# Patient Record
Sex: MALE | Race: WHITE | Employment: FULL TIME | URBAN - NONMETROPOLITAN AREA
[De-identification: names, ages, dates, MRNs, and addresses within clinical notes are randomized per-mention and may not be internally consistent; named-entity substitution may affect disease eponyms.]

---

## 2021-03-01 ENCOUNTER — OFFICE VISIT (OUTPATIENT)
Dept: FAMILY MEDICINE CLINIC | Age: 61
End: 2021-03-01
Payer: COMMERCIAL

## 2021-03-01 DIAGNOSIS — Z85.47 HISTORY OF TESTICULAR CANCER: ICD-10-CM

## 2021-03-01 DIAGNOSIS — M67.432 GANGLION CYST OF WRIST, LEFT: ICD-10-CM

## 2021-03-01 DIAGNOSIS — R00.2 PALPITATIONS: ICD-10-CM

## 2021-03-01 DIAGNOSIS — Z95.2 PRESENCE OF PROSTHETIC HEART VALVE: ICD-10-CM

## 2021-03-01 DIAGNOSIS — Z86.010 HISTORY OF COLON POLYPS: ICD-10-CM

## 2021-03-01 DIAGNOSIS — Z76.89 ENCOUNTER TO ESTABLISH CARE: Primary | ICD-10-CM

## 2021-03-01 DIAGNOSIS — Z86.73 HISTORY OF CVA (CEREBROVASCULAR ACCIDENT): ICD-10-CM

## 2021-03-01 DIAGNOSIS — I10 ESSENTIAL HYPERTENSION: ICD-10-CM

## 2021-03-01 DIAGNOSIS — E78.2 MIXED HYPERLIPIDEMIA: ICD-10-CM

## 2021-03-01 DIAGNOSIS — Z13.1 SCREENING FOR DIABETES MELLITUS: ICD-10-CM

## 2021-03-01 DIAGNOSIS — N18.31 STAGE 3A CHRONIC KIDNEY DISEASE (HCC): ICD-10-CM

## 2021-03-01 PROBLEM — H25.9 AGE-RELATED CATARACT OF BOTH EYES: Status: ACTIVE | Noted: 2018-02-05

## 2021-03-01 PROBLEM — N18.30 CKD (CHRONIC KIDNEY DISEASE) STAGE 3, GFR 30-59 ML/MIN (HCC): Status: ACTIVE | Noted: 2019-02-18

## 2021-03-01 PROCEDURE — 99204 OFFICE O/P NEW MOD 45 MIN: CPT | Performed by: NURSE PRACTITIONER

## 2021-03-01 RX ORDER — METOPROLOL SUCCINATE 25 MG/1
25 TABLET, EXTENDED RELEASE ORAL DAILY
COMMUNITY
Start: 2021-01-16 | End: 2021-04-27 | Stop reason: SDUPTHER

## 2021-03-01 RX ORDER — SIMVASTATIN 40 MG
TABLET ORAL
COMMUNITY
Start: 2020-05-21 | End: 2021-04-27 | Stop reason: SDUPTHER

## 2021-03-01 RX ORDER — RANITIDINE HCL 75 MG
75 TABLET ORAL 2 TIMES DAILY
COMMUNITY
End: 2021-03-01 | Stop reason: CLARIF

## 2021-03-01 RX ORDER — ASPIRIN 81 MG/1
81 TABLET, CHEWABLE ORAL DAILY
COMMUNITY

## 2021-03-01 SDOH — HEALTH STABILITY: MENTAL HEALTH: HOW OFTEN DO YOU HAVE A DRINK CONTAINING ALCOHOL?: NEVER

## 2021-03-01 SDOH — ECONOMIC STABILITY: FOOD INSECURITY: WITHIN THE PAST 12 MONTHS, YOU WORRIED THAT YOUR FOOD WOULD RUN OUT BEFORE YOU GOT MONEY TO BUY MORE.: NEVER TRUE

## 2021-03-01 SDOH — ECONOMIC STABILITY: TRANSPORTATION INSECURITY
IN THE PAST 12 MONTHS, HAS LACK OF TRANSPORTATION KEPT YOU FROM MEETINGS, WORK, OR FROM GETTING THINGS NEEDED FOR DAILY LIVING?: NO

## 2021-03-01 ASSESSMENT — PATIENT HEALTH QUESTIONNAIRE - PHQ9
SUM OF ALL RESPONSES TO PHQ QUESTIONS 1-9: 0
SUM OF ALL RESPONSES TO PHQ9 QUESTIONS 1 & 2: 0

## 2021-03-01 NOTE — PROGRESS NOTES
[address] [facility]  [address]  Dept: [phone]  Dept Fax: [phone]    Chief Complaint   Patient presents with    New Patient     former pt of Dr Pat Almaraz Cyst     has a ganglion cyst on left wrist last week was swollen and hurt was seen by urgent care and was advised to see ortho     HPI:   Patient presents today to establish care. He previously followed with Dr. Grady Roman. He has a past medical history of hypertension, hyperlipidemia, mitral valve insufficiency with valve replacement 2009. Palpitations, testicular cancer with radiation 2008; no longer follows with Dr. Yarelis Dale, age related bilateral cataract removal 2019, CVA, CKD. He is concerned today for a bump on his left wrist. He noticed the bump last week and reports it has been unchanged. He denies pain, weakness, numbness, tingling. He went to urgent care last week and was diagnosed with ganglion cyst.    Hypertension  This is a chronic problem. The current episode started more than 1 year ago. The problem is controlled. Pertinent negatives include no anxiety, chest pain, headaches, malaise/fatigue, orthopnea, palpitations, peripheral edema, PND or shortness of breath. There are no associated agents to hypertension. Risk factors for coronary artery disease include male gender, obesity and dyslipidemia. Past treatments include beta blockers. The current treatment provides significant improvement. Compliance problems include exercise and diet. Hypertensive end-organ damage includes kidney disease and CVA. Identifiable causes of hypertension include chronic renal disease. Hyperlipidemia  This is a chronic problem. The current episode started more than 1 year ago. Exacerbating diseases include chronic renal disease. Factors aggravating his hyperlipidemia include beta blockers.  Pertinent negatives include no chest pain, focal sensory loss, focal weakness, leg pain, myalgias or shortness of breath. Current antihyperlipidemic treatment includes statins. Risk factors for coronary artery disease include male sex, hypertension, dyslipidemia and obesity. Heart palpitations, history valve replacement  Previously followed with Dr. Hugo Jonas yearly but no longer on his insurance. The 10-year ASCVD risk score (Connie Jason, et al., 2013) is: 10.1%    Values used to calculate the score:      Age: 56 years      Sex: Male      Is Non- : No      Diabetic: No      Tobacco smoker: No      Systolic Blood Pressure: 406 mmHg      Is BP treated: No      HDL Cholesterol: 49 mg/dL      Total Cholesterol: 168 mg/dL    BP Readings from Last 3 Encounters:   03/01/21 138/80        Pulse Readings from Last 3 Encounters:   03/01/21 77        Wt Readings from Last 3 Encounters:   03/01/21 232 lb 12.8 oz (105.6 kg)        Current Outpatient Medications   Medication Sig Dispense Refill    aspirin 81 MG chewable tablet Take 81 mg by mouth daily      metoprolol succinate (TOPROL XL) 25 MG extended release tablet Take 25 mg by mouth daily      simvastatin (ZOCOR) 40 MG tablet TAKE 1 TABLET BY MOUTH ONCE DAILY AT BEDTIME       No current facility-administered medications for this visit.       Past Medical History:   Diagnosis Date    Age-related cataract of both eyes 02/05/2018 2/2018 planning removal.    H/O testicular cancer 2008    Hyperlipidemia     Hypertension     Nonrheumatic mitral valve insufficiency 09/01/2010     Past Surgical History:   Procedure Laterality Date    CARDIAC SURGERY      CARDIAC VALVE REPLACEMENT  2009    CATARACT REMOVAL Bilateral     HERNIA REPAIR      SMALL INTESTINE SURGERY      VEIN SURGERY       Family History   Problem Relation Age of Onset    High Blood Pressure Mother     Cancer Mother     Other Mother      Social History     Socioeconomic History    Marital status: Unknown     Spouse name: Not on file    Number of children: her ADL's without problem. He is independent, he cooks,drives, bathes, and gets dressed without assistance. He is . He has 2 children. He does work. He works 40 hours a week. He is happy with his life. He rates his stress level a 5 in a scale 1-10. Health Maintenance   Topic Date Due    Hepatitis C screen  Never done    COVID-19 Vaccine (1 of 2) Never done    Shingles Vaccine (1 of 2) Never done    Colon cancer screen colonoscopy  Never done    Flu vaccine (1) 03/01/2022 (Originally 9/1/2020)    A1C test (Diabetic or Prediabetic)  03/08/2022    Lipid screen  03/08/2022    Potassium monitoring  03/08/2022    Creatinine monitoring  03/08/2022    DTaP/Tdap/Td vaccine (2 - Td) 06/27/2026    Hepatitis A vaccine  Aged Out    Hepatitis B vaccine  Aged Out    Hib vaccine  Aged Out    Meningococcal (ACWY) vaccine  Aged Out    Pneumococcal 0-64 years Vaccine  Aged Out    HIV screen  Discontinued       Subjective:     Review of Systems   Constitutional: Negative for chills, diaphoresis, fatigue, fever and malaise/fatigue. HENT: Negative. Eyes: Negative. Respiratory: Negative for cough, shortness of breath and wheezing. Cardiovascular: Negative for chest pain, palpitations, orthopnea, leg swelling and PND. Gastrointestinal: Negative for abdominal pain, constipation, diarrhea and nausea. Endocrine: Negative for cold intolerance, heat intolerance, polydipsia, polyphagia and polyuria. Genitourinary: Negative. Musculoskeletal: Negative for arthralgias and myalgias. Skin: Negative. Allergic/Immunologic: Negative for environmental allergies. Neurological: Negative for dizziness, focal weakness, light-headedness and headaches. Psychiatric/Behavioral: Negative for agitation, decreased concentration, dysphoric mood, self-injury, sleep disturbance and suicidal ideas. The patient is not nervous/anxious.         Objective:     Vitals:    03/01/21 1622   BP: 138/80   Pulse: 77   SpO2: 97%   Weight: 232 lb 12.8 oz (105.6 kg)   Height: 5' 9\" (1.753 m)        Estimated body mass index is 34.38 kg/m² as calculated from the following:    Height as of this encounter: 5' 9\" (1.753 m). Weight as of this encounter: 232 lb 12.8 oz (105.6 kg). Physical Exam  Constitutional:       Appearance: Normal appearance. He is well-developed and well-groomed. He is obese. HENT:      Head: Normocephalic. Nose: Nose normal.      Mouth/Throat:      Mouth: Mucous membranes are moist.   Eyes:      Conjunctiva/sclera: Conjunctivae normal.      Pupils: Pupils are equal, round, and reactive to light. Neck:      Musculoskeletal: Neck supple. Thyroid: No thyromegaly. Vascular: No carotid bruit or JVD. Cardiovascular:      Rate and Rhythm: Normal rate and regular rhythm. Heart sounds: Normal heart sounds. Pulmonary:      Effort: Pulmonary effort is normal. No respiratory distress. Breath sounds: Normal breath sounds. No wheezing. Abdominal:      General: Bowel sounds are normal.      Palpations: Abdomen is soft. Tenderness: There is no abdominal tenderness. Musculoskeletal:      Left wrist: He exhibits no tenderness. Arms:       Right lower leg: No edema. Left lower leg: No edema. Lymphadenopathy:      Cervical: No cervical adenopathy. Skin:     Capillary Refill: Capillary refill takes less than 2 seconds. Neurological:      Mental Status: He is alert and oriented to person, place, and time. Psychiatric:         Mood and Affect: Mood normal.         Behavior: Behavior is cooperative. PHQ Scores 3/1/2021   PHQ2 Score 0   PHQ9 Score 0     Interpretation of Total Score Depression Severity: 1-4 = Minimal depression, 5-9 = Mild depression, 10-14 = Moderate depression, 15-19 = Moderately severe depression, 20-27 = Severe depression     Assessment:     1. Encounter to establish care    2. Essential hypertension    3. Mixed hyperlipidemia    4.  Ganglion cyst of wrist, left    5. Presence of prosthetic heart valve    6. Palpitations    7. Stage 3a chronic kidney disease    8. Screening for diabetes mellitus    9. History of colon polyps    10. History of testicular cancer    11. History of CVA (cerebrovascular accident)        Plan:     Orders Placed This Encounter   Procedures    Lipid, Fasting     Standing Status:   Future     Number of Occurrences:   1     Standing Expiration Date:   3/1/2022    Microalbumin, Ur     Standing Status:   Future     Number of Occurrences:   1     Standing Expiration Date:   3/1/2022    TSH without Reflex     Standing Status:   Future     Number of Occurrences:   1     Standing Expiration Date:   3/1/2022    Comprehensive Metabolic Panel, Fasting     Standing Status:   Future     Number of Occurrences:   1     Standing Expiration Date:   3/1/2022    CBC Auto Differential     Standing Status:   Future     Number of Occurrences:   1     Standing Expiration Date:   4/30/2021    Hemoglobin A1C     Standing Status:   Future     Number of Occurrences:   1     Standing Expiration Date:   3/1/2022   Jocelyn Sue MD, Orthopedic Surgery, [place]     Referral Priority:   Routine     Referral Type:   Eval and Treat     Referral Reason:   Specialty Services Required     Referred to Provider:   Jason Rogel MD     Requested Specialty:   Orthopedic Surgery     Number of Visits Requested:   Juve Petersen MD, Cardiology, [facility]     Referral Priority:   Routine     Referral Type:   Eval and Treat     Referral Reason:   Specialty Services Required     Referred to Provider:   Lori Lo DO     Requested Specialty:   Cardiology     Number of Visits Requested:   1      Colonoscopy completed at [facility]  1/26/2018. Patient given educational materials - see patient instructions. Encouraged healthy diet and routine exercise. Instructed to continue current medications. All patient questions answered. Pt voiced understanding.

## 2021-03-01 NOTE — PATIENT INSTRUCTIONS
Patient Education        Ganglions: Care Instructions  Your Care Instructions     A ganglion is a small sac, or cyst, filled with a clear fluid that is like jelly. A ganglion may look like a bump on the hand or wrist. It also can appear on your feet, ankles, knees, or shoulders. It is not cancer. A ganglion can grow out of the protective area, or capsule, around a joint. It also can grow on a tendon sheath, which covers the ropelike tendons that connect muscle to bone. A ganglion may hurt or cause numbness if it presses on a nerve. Many ganglions do not need treatment, and they often go away on their own. But if a ganglion hurts, becomes larger, causes numbness, or limits your activity, your doctor may want to drain it with a needle and syringe or remove it with minor surgery. Follow-up care is a key part of your treatment and safety. Be sure to make and go to all appointments, and call your doctor if you are having problems. It's also a good idea to know your test results and keep a list of the medicines you take. How can you care for yourself at home? · Wear a wrist or finger splint as directed by your doctor. It will keep your wrist or hand from moving and help reduce the fluid in the cyst. This may be all you need for the ganglion to shrink and go away. · Do not smash a ganglion with a book or other heavy object. You may break a bone or otherwise injure your wrist by trying this folk remedy, and the ganglion may return anyway. · Do not try to drain the fluid by poking the ganglion with a pin or any other sharp object. You could cause an infection. When should you call for help? Call your doctor now or seek immediate medical care if:    · You have signs of infection, such as:  ? Increased pain, swelling, warmth, or redness. ? Red streaks leading from the cyst.  ? Pus draining from the cyst.  ? A fever.    Watch closely for changes in your health, and be sure to contact your doctor if:    · You have increasing pain.     · Your ganglion is getting larger.     · You still have pain or numbness from a ganglion. Where can you learn more? Go to [email or web address].[email or web address]. org and sign in to your TV Volume Wizard App account. Enter F519 in the Sky Storage box to learn more about \"Ganglions: Care Instructions. \"     If you do not have an account, please click on the \"Sign Up Now\" link. Current as of: March 2, 2020               Content Version: 12.6  © 1167-9278 [employer]. Care instructions adapted under license by [facility] [place] ([facility]). If you have questions about a medical condition or this instruction, always ask your healthcare professional. [address] any warranty or liability for your use of this information.

## 2021-03-08 ENCOUNTER — HOSPITAL ENCOUNTER (OUTPATIENT)
Age: 61
Setting detail: SPECIMEN
Discharge: HOME | End: 2021-03-08
Payer: COMMERCIAL

## 2021-03-08 DIAGNOSIS — Z13.1 SCREENING FOR DIABETES MELLITUS: ICD-10-CM

## 2021-03-08 DIAGNOSIS — E78.2 MIXED HYPERLIPIDEMIA: ICD-10-CM

## 2021-03-08 DIAGNOSIS — I10 ESSENTIAL HYPERTENSION: ICD-10-CM

## 2021-03-08 LAB
ABSOLUTE EOS #: 0.1 K/UL (ref 0–0.44)
ABSOLUTE IMMATURE GRANULOCYTE: <0.03 K/UL (ref 0–0.3)
ABSOLUTE LYMPH #: 1.71 K/UL (ref 1.1–3.7)
ABSOLUTE MONO #: 0.64 K/UL (ref 0.1–1.2)
ALBUMIN SERPL-MCNC: 4.7 G/DL (ref 3.5–5.2)
ALBUMIN/GLOBULIN RATIO: 2.4 (ref 1–2.5)
ALP BLD-CCNC: 42 U/L (ref 40–129)
ALT SERPL-CCNC: 29 U/L (ref 5–41)
ANION GAP SERPL CALCULATED.3IONS-SCNC: 12 MMOL/L (ref 9–17)
AST SERPL-CCNC: 23 U/L
BASOPHILS # BLD: 1 % (ref 0–2)
BASOPHILS ABSOLUTE: 0.05 K/UL (ref 0–0.2)
BILIRUB SERPL-MCNC: 1.41 MG/DL (ref 0.3–1.2)
BUN BLDV-MCNC: 22 MG/DL (ref 8–23)
BUN/CREAT BLD: 16 (ref 9–20)
CALCIUM SERPL-MCNC: 9.8 MG/DL (ref 8.6–10.4)
CHLORIDE BLD-SCNC: 101 MMOL/L (ref 98–107)
CHOLESTEROL, FASTING: 168 MG/DL
CHOLESTEROL/HDL RATIO: 3.4
CO2: 29 MMOL/L (ref 20–31)
CREAT SERPL-MCNC: 1.34 MG/DL (ref 0.7–1.2)
DIFFERENTIAL TYPE: NORMAL
EOSINOPHILS RELATIVE PERCENT: 2 % (ref 1–4)
ESTIMATED AVERAGE GLUCOSE: 123 MG/DL
GFR AFRICAN AMERICAN: >60 ML/MIN
GFR NON-AFRICAN AMERICAN: 54 ML/MIN
GFR SERPL CREATININE-BSD FRML MDRD: ABNORMAL ML/MIN/{1.73_M2}
GFR SERPL CREATININE-BSD FRML MDRD: ABNORMAL ML/MIN/{1.73_M2}
GLUCOSE FASTING: 109 MG/DL (ref 70–99)
HBA1C MFR BLD: 5.9 % (ref 4–6)
HCT VFR BLD CALC: 48.4 % (ref 40.7–50.3)
HDLC SERPL-MCNC: 49 MG/DL
HEMOGLOBIN: 15.5 G/DL (ref 13–17)
IMMATURE GRANULOCYTES: 0 %
LDL CHOLESTEROL: 93 MG/DL (ref 0–130)
LYMPHOCYTES # BLD: 26 % (ref 24–43)
MCH RBC QN AUTO: 28.3 PG (ref 25.2–33.5)
MCHC RBC AUTO-ENTMCNC: 32 G/DL (ref 25.2–33.5)
MCV RBC AUTO: 88.5 FL (ref 82.6–102.9)
MONOCYTES # BLD: 10 % (ref 3–12)
NRBC AUTOMATED: 0 PER 100 WBC
PDW BLD-RTO: 13.8 % (ref 11.8–14.4)
PLATELET # BLD: 214 K/UL (ref 138–453)
PLATELET ESTIMATE: NORMAL
PMV BLD AUTO: 11.2 FL (ref 8.1–13.5)
POTASSIUM SERPL-SCNC: 4.7 MMOL/L (ref 3.7–5.3)
RBC # BLD: 5.47 M/UL (ref 4.21–5.77)
RBC # BLD: NORMAL 10*6/UL
SEG NEUTROPHILS: 61 % (ref 36–65)
SEGMENTED NEUTROPHILS ABSOLUTE COUNT: 4.11 K/UL (ref 1.5–8.1)
SODIUM BLD-SCNC: 142 MMOL/L (ref 135–144)
TOTAL PROTEIN: 6.7 G/DL (ref 6.4–8.3)
TRIGLYCERIDE, FASTING: 130 MG/DL
TSH SERPL DL<=0.05 MIU/L-ACNC: 1.42 MIU/L (ref 0.3–5)
VLDLC SERPL CALC-MCNC: NORMAL MG/DL (ref 1–30)
WBC # BLD: 6.6 K/UL (ref 3.5–11.3)
WBC # BLD: NORMAL 10*3/UL

## 2021-03-08 PROCEDURE — 80053 COMPREHEN METABOLIC PANEL: CPT

## 2021-03-08 PROCEDURE — 84443 ASSAY THYROID STIM HORMONE: CPT

## 2021-03-08 PROCEDURE — 82570 ASSAY OF URINE CREATININE: CPT

## 2021-03-08 PROCEDURE — 36415 COLL VENOUS BLD VENIPUNCTURE: CPT

## 2021-03-08 PROCEDURE — 82043 UR ALBUMIN QUANTITATIVE: CPT

## 2021-03-08 PROCEDURE — 80061 LIPID PANEL: CPT

## 2021-03-08 PROCEDURE — 83036 HEMOGLOBIN GLYCOSYLATED A1C: CPT

## 2021-03-08 PROCEDURE — 85025 COMPLETE CBC W/AUTO DIFF WBC: CPT

## 2021-03-09 LAB
CREATININE URINE: 45.9 MG/DL (ref 39–259)
MICROALBUMIN/CREAT 24H UR: <12 MG/L
MICROALBUMIN/CREAT UR-RTO: NORMAL MCG/MG CREAT

## 2021-03-10 ENCOUNTER — TELEPHONE (OUTPATIENT)
Dept: FAMILY MEDICINE CLINIC | Age: 61
End: 2021-03-10

## 2021-03-10 VITALS
OXYGEN SATURATION: 97 % | WEIGHT: 232.8 LBS | DIASTOLIC BLOOD PRESSURE: 80 MMHG | SYSTOLIC BLOOD PRESSURE: 138 MMHG | HEIGHT: 69 IN | HEART RATE: 77 BPM | BODY MASS INDEX: 34.48 KG/M2

## 2021-03-10 PROBLEM — M67.432 GANGLION CYST OF WRIST, LEFT: Status: ACTIVE | Noted: 2021-03-10

## 2021-03-10 PROBLEM — R00.2 PALPITATIONS: Status: ACTIVE | Noted: 2021-03-10

## 2021-03-10 PROBLEM — H25.9 AGE-RELATED CATARACT OF BOTH EYES: Status: RESOLVED | Noted: 2018-02-05 | Resolved: 2021-03-10

## 2021-03-10 ASSESSMENT — ENCOUNTER SYMPTOMS
DIARRHEA: 0
WHEEZING: 0
SHORTNESS OF BREATH: 0
CONSTIPATION: 0
NAUSEA: 0
ABDOMINAL PAIN: 0
EYES NEGATIVE: 1
ORTHOPNEA: 0
COUGH: 0

## 2021-04-02 ENCOUNTER — OFFICE VISIT (OUTPATIENT)
Dept: ORTHOPEDIC SURGERY | Age: 61
End: 2021-04-02
Payer: COMMERCIAL

## 2021-04-02 VITALS
DIASTOLIC BLOOD PRESSURE: 104 MMHG | HEART RATE: 92 BPM | SYSTOLIC BLOOD PRESSURE: 140 MMHG | BODY MASS INDEX: 33.82 KG/M2 | OXYGEN SATURATION: 95 % | WEIGHT: 229 LBS

## 2021-04-02 DIAGNOSIS — M25.562 ACUTE PAIN OF LEFT KNEE: ICD-10-CM

## 2021-04-02 DIAGNOSIS — R22.32 MASS OF LEFT WRIST: Primary | ICD-10-CM

## 2021-04-02 PROCEDURE — 99203 OFFICE O/P NEW LOW 30 MIN: CPT | Performed by: ORTHOPAEDIC SURGERY

## 2021-04-02 RX ORDER — DICLOFENAC SODIUM 75 MG/1
75 TABLET, DELAYED RELEASE ORAL 2 TIMES DAILY
Qty: 28 TABLET | Refills: 0 | Status: SHIPPED | OUTPATIENT
Start: 2021-04-02 | End: 2021-05-03 | Stop reason: ALTCHOICE

## 2021-04-07 ENCOUNTER — TELEPHONE (OUTPATIENT)
Dept: FAMILY MEDICINE CLINIC | Age: 61
End: 2021-04-07

## 2021-04-27 ENCOUNTER — TELEPHONE (OUTPATIENT)
Dept: FAMILY MEDICINE CLINIC | Age: 61
End: 2021-04-27

## 2021-04-27 DIAGNOSIS — E78.2 MIXED HYPERLIPIDEMIA: ICD-10-CM

## 2021-04-27 DIAGNOSIS — I10 ESSENTIAL HYPERTENSION: Primary | ICD-10-CM

## 2021-04-27 RX ORDER — METOPROLOL SUCCINATE 25 MG/1
25 TABLET, EXTENDED RELEASE ORAL DAILY
Qty: 30 TABLET | Refills: 11 | Status: SHIPPED | OUTPATIENT
Start: 2021-04-27 | End: 2022-05-15

## 2021-04-27 RX ORDER — SIMVASTATIN 40 MG
TABLET ORAL
Qty: 30 TABLET | Refills: 5 | Status: SHIPPED | OUTPATIENT
Start: 2021-04-27 | End: 2021-06-15 | Stop reason: SDUPTHER

## 2021-05-01 NOTE — PROGRESS NOTES
1200 [facility]  [address] [facility], [address] [facility]  Dept: [phone]  Dept Fax: [phone]    Hao He is a 56 y.o. male who presents today for his medical conditions/complaints as noted below. Hao He c/o of Knee Pain (c/o left knee pain for about a month says knee popped out at work. has some swelling difficulty walking)      HPI:   Patient presents to the office to discuss left-sided knee pain. Symptoms started 4 to 6 weeks ago. Patient states he was initially evaluated at an urgent care in [place]. He was told he had a lot of fluid, and possibly arthritis to the left knee. He is wearing a knee brace during the visit today and states it was ordered by urgent care. He has been wearing this daily, especially while working 8 hours a day on his feet. He does not wear it at night. Patient states he feels like his knee \"popped out\" and seems to be worsening. He is now using a cane to help with ambulation. He was evaluated by Dr. Edson Garrido (orthopedic specialist) on 4/2/2021. It was recommended he obtain x-rays of the knee, and an ultrasound to further evaluate a cyst to the left wrist.  He did not complete the imaging due to concerns for cost and insurance coverage. He did complete a 2-week course of diclofenac 75 mg twice daily and is not sure if it helped. He has chronic renal dysfunction dating back to 2/17/2018. This was discussed again with patient during office visit today.        BP Readings from Last 3 Encounters:   05/03/21 118/80   04/02/21 (!) 140/104   03/01/21 138/80              ([id number])    Pulse Readings from Last 3 Encounters:   05/03/21 84   04/02/21 92   03/01/21 77        Wt Readings from Last 3 Encounters:   05/03/21 233 lb 9.6 oz (106 kg)   04/02/21 229 lb (103.9 kg)   03/01/21 232 lb 12.8 oz (105.6 kg)       Past Medical History:   Diagnosis Date    Age-related cataract of both eyes 02/05/2018 2/2018 planning removal. and wheezing. Cardiovascular: Negative for chest pain. Musculoskeletal: Positive for arthralgias (left knee) and gait problem. Objective:     Vitals:    05/03/21 1619   BP: 118/80   Pulse: 84   SpO2: 98%   Weight: 233 lb 9.6 oz (106 kg)      Estimated body mass index is 34.5 kg/m² as calculated from the following:    Height as of 3/1/21: 5' 9\" (1.753 m). Weight as of this encounter: 233 lb 9.6 oz (106 kg). Physical Exam  Constitutional:       Appearance: Normal appearance. HENT:      Head: Normocephalic. Eyes:      Conjunctiva/sclera: Conjunctivae normal.   Neck:      Musculoskeletal: Neck supple. Cardiovascular:      Rate and Rhythm: Normal rate and regular rhythm. Heart sounds: Normal heart sounds. No murmur. Pulmonary:      Effort: Pulmonary effort is normal.      Breath sounds: Normal breath sounds. No wheezing. Musculoskeletal:      Left knee: He exhibits decreased range of motion (due to pain) and effusion (small). He exhibits no erythema, no LCL laxity and no MCL laxity. Tenderness found. Medial joint line tenderness noted. Neurological:      Mental Status: He is alert and oriented to person, place, and time. Gait: Gait abnormal (Antalgic, using cane). Psychiatric:         Mood and Affect: Mood is anxious.          PHQ Scores 3/1/2021   PHQ2 Score 0   PHQ9 Score 0     Interpretation of Total Score Depression Severity: 1-4 = Minimal depression, 5-9 = Mild depression, 10-14 = Moderate depression, 15-19 = Moderately severe depression, 20-27 = Severe depression     Lab Results   Component Value Date     03/08/2021    K 4.7 03/08/2021     03/08/2021    CO2 29 03/08/2021    BUN 22 03/08/2021    CREATININE 1.34 (H) 03/08/2021    CALCIUM 9.8 03/08/2021    PROT 6.7 03/08/2021    LABALBU 4.7 03/08/2021    BILITOT 1.41 (H) 03/08/2021    ALKPHOS 42 03/08/2021    AST 23 03/08/2021    ALT 29 03/08/2021    LABGLOM 54 (L) 03/08/2021    GFRAA >60 03/08/2021     Lab Results Component Value Date    LABA1C 5.9 03/08/2021       Lab Results   Component Value Date    GLUF 109 (H) 03/08/2021    LABMICR CANNOT BE CALCULATED 03/08/2021    CREATININE 1.34 (H) 03/08/2021       Assessment:     1. Acute pain of left knee    2. Stage 3a chronic kidney disease    3. Ganglion cyst of wrist, left    4. Essential hypertension    5. Mixed hyperlipidemia    6. Presence of prosthetic heart valve    7. History of CVA (cerebrovascular accident)    8. History of testicular cancer    9. History of colon polyps        Plan:     Encouraged to obtain previous x-rays ordered by urgent care, and/or complete orders recommended by Dr. Brea Pavon and schedule follow-up appointment. Patient again questioned what the cost would be and whether or not his insurance would cover the x-rays. Explained each insurance plan is different; recommend he call his insurance company for further information regarding benefits. Instructed to avoid NSAIDs due to chronic renal dysfunction. All patient questions answered. Patient voiced understanding. Instructed to continue current medications, diet and exercise. Patient agreed with treatment plan. Follow up as directed. Return if symptoms worsen or fail to improve. This note was generated completely or in part utilizing Dragon dictation speech recognition software. Occasionally, words are mistranscribed and despite editing, the text may contain inaccuracies due to incorrect word recognition. If further clarification is needed please contact the office at [phone].     Electronically signed by JACY Alvarez CNP on 5/8/2021

## 2021-05-03 ENCOUNTER — OFFICE VISIT (OUTPATIENT)
Dept: FAMILY MEDICINE CLINIC | Age: 61
End: 2021-05-03
Payer: COMMERCIAL

## 2021-05-03 DIAGNOSIS — Z85.47 HISTORY OF TESTICULAR CANCER: ICD-10-CM

## 2021-05-03 DIAGNOSIS — Z86.010 HISTORY OF COLON POLYPS: ICD-10-CM

## 2021-05-03 DIAGNOSIS — E78.2 MIXED HYPERLIPIDEMIA: ICD-10-CM

## 2021-05-03 DIAGNOSIS — I10 ESSENTIAL HYPERTENSION: ICD-10-CM

## 2021-05-03 DIAGNOSIS — Z86.73 HISTORY OF CVA (CEREBROVASCULAR ACCIDENT): ICD-10-CM

## 2021-05-03 DIAGNOSIS — M67.432 GANGLION CYST OF WRIST, LEFT: ICD-10-CM

## 2021-05-03 DIAGNOSIS — Z95.2 PRESENCE OF PROSTHETIC HEART VALVE: ICD-10-CM

## 2021-05-03 DIAGNOSIS — N18.31 STAGE 3A CHRONIC KIDNEY DISEASE (HCC): ICD-10-CM

## 2021-05-03 DIAGNOSIS — M25.562 ACUTE PAIN OF LEFT KNEE: Primary | ICD-10-CM

## 2021-05-03 PROCEDURE — 99213 OFFICE O/P EST LOW 20 MIN: CPT | Performed by: NURSE PRACTITIONER

## 2021-05-08 VITALS
DIASTOLIC BLOOD PRESSURE: 80 MMHG | HEART RATE: 84 BPM | BODY MASS INDEX: 34.5 KG/M2 | WEIGHT: 233.6 LBS | OXYGEN SATURATION: 98 % | SYSTOLIC BLOOD PRESSURE: 118 MMHG

## 2021-05-08 ASSESSMENT — ENCOUNTER SYMPTOMS
SHORTNESS OF BREATH: 0
WHEEZING: 0

## 2021-05-18 DIAGNOSIS — M25.562 ACUTE PAIN OF LEFT KNEE: ICD-10-CM

## 2021-06-04 ENCOUNTER — OFFICE VISIT (OUTPATIENT)
Dept: ORTHOPEDIC SURGERY | Age: 61
End: 2021-06-04
Payer: COMMERCIAL

## 2021-06-04 VITALS
BODY MASS INDEX: 34.26 KG/M2 | SYSTOLIC BLOOD PRESSURE: 144 MMHG | OXYGEN SATURATION: 94 % | WEIGHT: 232 LBS | HEART RATE: 85 BPM | DIASTOLIC BLOOD PRESSURE: 104 MMHG

## 2021-06-04 DIAGNOSIS — M22.2X2 PATELLOFEMORAL PAIN SYNDROME OF LEFT KNEE: Primary | ICD-10-CM

## 2021-06-04 PROCEDURE — 99213 OFFICE O/P EST LOW 20 MIN: CPT | Performed by: ORTHOPAEDIC SURGERY

## 2021-06-04 NOTE — PROGRESS NOTES
Orthopedic Knee Encounter Note     Chief complaint: Left knee pain    HPI: José Garay is a 56 y.o. male who presents for evaluation of his left knee. He indicates that he has been having pain for about 2 months now. He states that he was working and his knee popped. Ever since he has been having anterior knee pain that is been fairly constant bothering him with and without weightbearing activities. He did initially have some swelling but at the time was on Voltaren I have prescribed for his wrist and he states that this helped with the swelling and it did help some with his pain as well. His pain does not radiate and it is not associate with any numbness or tingling. He has not had any recurrent popping since the initial incident. He does report some locking in his knee. Previous treatment:    NSAIDs: Voltaren    Injections: None    Physical therapy: No    Surgeries: None    Review of Systems:     Constitution: no fever or chills   Pain level: 5/10  Musculoskeletal: As noted in the HPI   Neurologic: no neurologic symptoms    Past Medical History  Bonnie GILES  has a past medical history of Age-related cataract of both eyes, H/O testicular cancer, Hyperlipidemia, Hypertension, and Nonrheumatic mitral valve insufficiency. Past Surgical History  Bonnie GILES  has a past surgical history that includes hernia repair; Cardiac surgery; Cardiac valve replacement (2009); Small intestine surgery; Vein Surgery; and Cataract removal (Bilateral). Current Medications  Current Outpatient Medications   Medication Sig Dispense Refill    simvastatin (ZOCOR) 40 MG tablet TAKE 1 TABLET BY MOUTH ONCE DAILY AT BEDTIME 30 tablet 5    metoprolol succinate (TOPROL XL) 25 MG extended release tablet Take 1 tablet by mouth daily 30 tablet 11    aspirin 81 MG chewable tablet Take 81 mg by mouth daily       No current facility-administered medications for this visit. Allergies  Allergies have been reviewed.   Ada Alva is allergic to cephalexin and penicillin v.    Social History  Bonnie GILES  reports that he has never smoked. He has never used smokeless tobacco. He reports previous alcohol use. He reports previous drug use. Family History  Bonnie GILES's family history includes Cancer in his mother; High Blood Pressure in his mother; Other in his mother.      Physical Exam:     BP (!) 144/104   Pulse 85   Wt 232 lb (105.2 kg)   SpO2 94%   BMI 34.26 kg/m²    General Appearance: alert, well appearing, and in no distress  Mental Status: alert, oriented to person, place, and time  Gait: antalgic  Hips: Good pain-free ROM without crepitation    Knee: Bilateral    Skin: warm and dry, no rash or erythema  Vasculature: 2+ pedal pulses bilaterally  Neuro: Sensation grossly intact to light touch diffusely  Alignment: Mild genu varum alignment to his left knee  Tenderness: Tender to palpation mildly along the medial joint line    ROM: (Degrees)    Right   A P   Left   A P    Extension  0    Extension  0   Flexion   115    Flexion   90      Crepitation  No    Crepitation  No      Muscle strength:    Right       Left    Flexion   5    Flexion   5  Extension  5    Extension  5  SLR   5    SLR   5    Extensor lag   n    Extensor lag  n      Special testing:    Right          Left    n    Pain with deep knee flexion   y  n    Patellar grind     n  n    Patellar apprehension    n  n    Patellar glide     n    n    Lachman     n  n    Anterior drawer    n  n    Pivot shift     n  n    Posterior drawer    n  n    Dial test     n  n    Posterolateral drawer    n  n    Posterior Sag     n  n    MCL      n  n    LCL      n    n    Medial joint line tenderness   y  n    Lateral joint line tenderness   n  n    Appley's     n      Imaging:  X-ray images not available for review today but her report of an x-ray of his left knee completed on 5/12/2021 indicates that he has a joint effusion with mild patellofemoral degenerative spurring    Impression/Plan:     José Garay is a

## 2021-06-15 ENCOUNTER — TELEPHONE (OUTPATIENT)
Dept: FAMILY MEDICINE CLINIC | Age: 61
End: 2021-06-15

## 2021-06-15 DIAGNOSIS — E78.2 MIXED HYPERLIPIDEMIA: ICD-10-CM

## 2021-06-15 RX ORDER — SIMVASTATIN 40 MG
TABLET ORAL
Qty: 30 TABLET | Refills: 5 | Status: SHIPPED | OUTPATIENT
Start: 2021-06-15 | End: 2022-03-02 | Stop reason: SDUPTHER

## 2021-08-31 NOTE — PATIENT INSTRUCTIONS
Thank you for enrolling in [address] Please follow the instructions below to securely access your online medical record. [facility] allows you to send messages to your doctor, view your test results, renew your prescriptions, schedule appointments, and more. How Do I Sign Up? 1. In your Internet browser, go to [email or web address]  2. Click on the Sign Up Now link in the Sign In box. You will see the New Member Sign Up page. 3. Enter your [employer] Access Code exactly as it appears below. You will not need to use this code after youve completed the sign-up process. If you do not sign up before the expiration date, you must request a new code. [employer] Access Code: Activation code not generated  Current [facility] Status: Patient Declined    4. Enter your Social Security Number ([id number]) and Date of Birth (mm/dd/yyyy) as indicated and click Submit. You will be taken to the next sign-up page. 5. Create a [facility] ID. This will be your [facility] login ID and cannot be changed, so think of one that is secure and easy to remember. 6. Create a [employer] [facility] password. You can change your password at any time. 7. Enter your Password Reset Question and Answer. This can be used at a later time if you forget your password. 8. Enter your e-mail address. You will receive e-mail notification when new information is available in [address] 9. Click Sign Up. You can now view your medical record. Additional Information  If you have questions, please contact your physician practice where you receive care. Remember, [facility] is NOT to be used for urgent needs. For medical emergencies, dial 911.

## 2021-09-01 ENCOUNTER — OFFICE VISIT (OUTPATIENT)
Dept: FAMILY MEDICINE CLINIC | Age: 61
End: 2021-09-01
Payer: COMMERCIAL

## 2021-09-01 VITALS
HEIGHT: 69 IN | OXYGEN SATURATION: 98 % | SYSTOLIC BLOOD PRESSURE: 116 MMHG | WEIGHT: 229.5 LBS | TEMPERATURE: 98.7 F | DIASTOLIC BLOOD PRESSURE: 80 MMHG | BODY MASS INDEX: 33.99 KG/M2 | RESPIRATION RATE: 16 BRPM | HEART RATE: 73 BPM

## 2021-09-01 DIAGNOSIS — N18.31 STAGE 3A CHRONIC KIDNEY DISEASE (HCC): ICD-10-CM

## 2021-09-01 DIAGNOSIS — E78.2 MIXED HYPERLIPIDEMIA: Primary | ICD-10-CM

## 2021-09-01 DIAGNOSIS — Z86.73 HISTORY OF CVA (CEREBROVASCULAR ACCIDENT): ICD-10-CM

## 2021-09-01 DIAGNOSIS — Z86.010 HISTORY OF COLON POLYPS: ICD-10-CM

## 2021-09-01 DIAGNOSIS — I10 ESSENTIAL HYPERTENSION: ICD-10-CM

## 2021-09-01 DIAGNOSIS — Z95.2 PRESENCE OF PROSTHETIC HEART VALVE: ICD-10-CM

## 2021-09-01 PROCEDURE — 99214 OFFICE O/P EST MOD 30 MIN: CPT | Performed by: NURSE PRACTITIONER

## 2021-09-01 SDOH — ECONOMIC STABILITY: FOOD INSECURITY: WITHIN THE PAST 12 MONTHS, YOU WORRIED THAT YOUR FOOD WOULD RUN OUT BEFORE YOU GOT MONEY TO BUY MORE.: NEVER TRUE

## 2021-09-01 SDOH — ECONOMIC STABILITY: FOOD INSECURITY: WITHIN THE PAST 12 MONTHS, THE FOOD YOU BOUGHT JUST DIDN'T LAST AND YOU DIDN'T HAVE MONEY TO GET MORE.: NEVER TRUE

## 2021-09-01 ASSESSMENT — SOCIAL DETERMINANTS OF HEALTH (SDOH): HOW HARD IS IT FOR YOU TO PAY FOR THE VERY BASICS LIKE FOOD, HOUSING, MEDICAL CARE, AND HEATING?: NOT HARD AT ALL

## 2021-09-01 ASSESSMENT — PATIENT HEALTH QUESTIONNAIRE - PHQ9
SUM OF ALL RESPONSES TO PHQ9 QUESTIONS 1 & 2: 0
SUM OF ALL RESPONSES TO PHQ QUESTIONS 1-9: 0
1. LITTLE INTEREST OR PLEASURE IN DOING THINGS: 0
2. FEELING DOWN, DEPRESSED OR HOPELESS: 0

## 2021-09-01 NOTE — PROGRESS NOTES
[address] [facility]  [address] [facility], [address] [facility]  Dept: [phone]  Dept Fax: [phone]    Chava Higuera is a 59 y.o. male who presents today for his medical conditions/complaints as noted below. Chava Higuera c/o of 6 Month Follow-Up (Hypertension, Hyperlipidemia- last lipids 3/8/2021. He has not gotten the Covid Vaccine)      HPI:   Patient presents office for routine 6-month follow-up. He reports the knee pain has improved. He has not been monitoring his blood pressure at home. Hypertension  This is a chronic problem. The current episode started more than 1 year ago. The problem is controlled. Pertinent negatives include no anxiety, chest pain, headaches, malaise/fatigue, orthopnea, palpitations, peripheral edema, PND or shortness of breath. There are no associated agents to hypertension. Risk factors for coronary artery disease include male gender, obesity and dyslipidemia. Past treatments include beta blockers. The current treatment provides significant improvement. Compliance problems include exercise and diet. Hypertensive end-organ damage includes kidney disease and CVA. Identifiable causes of hypertension include chronic renal disease. Hyperlipidemia  This is a chronic problem. The current episode started more than 1 year ago. Exacerbating diseases include chronic renal disease. Factors aggravating his hyperlipidemia include beta blockers. Pertinent negatives include no chest pain, focal sensory loss, focal weakness, leg pain, myalgias or shortness of breath. Current antihyperlipidemic treatment includes statins. Risk factors for coronary artery disease include male sex, hypertension, dyslipidemia and obesity.      The 10-year ASCVD risk score (Maico Knutson, et al., 2013) is: 8.2%    Values used to calculate the score:      Age: 59 years      Sex: Male      Is Non- : No      Diabetic: No      Tobacco smoker: No      Systolic Blood Pressure: 116 mmHg      Is BP treated: No      HDL Cholesterol: 49 mg/dL      Total Cholesterol: 168 mg/dL      BP Readings from Last 3 Encounters:   09/01/21 116/80   06/04/21 (!) 144/104   05/03/21 118/80              ([id number])    Pulse Readings from Last 3 Encounters:   09/01/21 73   06/04/21 85   05/03/21 84        Wt Readings from Last 3 Encounters:   09/01/21 229 lb 8 oz (104.1 kg)   06/04/21 232 lb (105.2 kg)   05/03/21 233 lb 9.6 oz (106 kg)       Past Medical History:   Diagnosis Date    Age-related cataract of both eyes 02/05/2018 2/2018 planning removal.    H/O testicular cancer 2008    Hyperlipidemia     Hypertension     Nonrheumatic mitral valve insufficiency 09/01/2010      Past Surgical History:   Procedure Laterality Date    CARDIAC SURGERY      CARDIAC VALVE REPLACEMENT  2009    CATARACT REMOVAL Bilateral     HERNIA REPAIR      SMALL INTESTINE SURGERY      VEIN SURGERY         Family History   Problem Relation Age of Onset    High Blood Pressure Mother     Cancer Mother     Other Mother        Social History     Tobacco Use    Smoking status: Never Smoker    Smokeless tobacco: Never Used   Vaping Use    Vaping Use: Never used   Substance Use Topics    Alcohol use: Not Currently    Drug use: Not Currently      Current Outpatient Medications   Medication Sig Dispense Refill    simvastatin (ZOCOR) 40 MG tablet TAKE 1 TABLET BY MOUTH ONCE DAILY AT BEDTIME 30 tablet 5    metoprolol succinate (TOPROL XL) 25 MG extended release tablet Take 1 tablet by mouth daily 30 tablet 11    aspirin 81 MG chewable tablet Take 81 mg by mouth daily       No current facility-administered medications for this visit.      Allergies   Allergen Reactions    Cephalexin Swelling    Penicillin V Swelling       Health Maintenance   Topic Date Due    Hepatitis C screen  Never done    COVID-19 Vaccine (1) Never done    Colon cancer screen colonoscopy  Never done    Shingles Vaccine (1 of 2) Nose normal.      Mouth/Throat:      Mouth: Mucous membranes are moist.      Pharynx: Oropharynx is clear. Eyes:      Conjunctiva/sclera: Conjunctivae normal.      Pupils: Pupils are equal, round, and reactive to light. Neck:      Thyroid: No thyromegaly. Vascular: No carotid bruit. Cardiovascular:      Rate and Rhythm: Normal rate and regular rhythm. Heart sounds: Normal heart sounds. Pulmonary:      Effort: Pulmonary effort is normal.      Breath sounds: Normal breath sounds. No wheezing. Abdominal:      General: Bowel sounds are normal.      Palpations: Abdomen is soft. Tenderness: There is no abdominal tenderness. Musculoskeletal:      Cervical back: Neck supple. Right lower leg: No edema. Left lower leg: No edema. Lymphadenopathy:      Cervical: No cervical adenopathy. Skin:     Capillary Refill: Capillary refill takes less than 2 seconds. Neurological:      Mental Status: He is alert and oriented to person, place, and time. Gait: Gait normal.   Psychiatric:         Mood and Affect: Mood normal.         Behavior: Behavior is cooperative.          PHQ Scores 9/1/2021 3/1/2021   PHQ2 Score 0 0   PHQ9 Score 0 0     Interpretation of Total Score Depression Severity: 1-4 = Minimal depression, 5-9 = Mild depression, 10-14 = Moderate depression, 15-19 = Moderately severe depression, 20-27 = Severe depression     Lab Results   Component Value Date     03/08/2021    K 4.7 03/08/2021     03/08/2021    CO2 29 03/08/2021    BUN 22 03/08/2021    CREATININE 1.34 (H) 03/08/2021    CALCIUM 9.8 03/08/2021    PROT 6.7 03/08/2021    LABALBU 4.7 03/08/2021    BILITOT 1.41 (H) 03/08/2021    ALKPHOS 42 03/08/2021    AST 23 03/08/2021    ALT 29 03/08/2021    LABGLOM 54 (L) 03/08/2021    GFRAA >60 03/08/2021     Lab Results   Component Value Date    LABA1C 5.9 03/08/2021       Lab Results   Component Value Date    GLUF 109 (H) 03/08/2021    LABMICR CANNOT BE CALCULATED 03/08/2021    CREATININE 1.34 (H) 03/08/2021         Assessment:     1. Mixed hyperlipidemia    2. Essential hypertension    3. Stage 3a chronic kidney disease ([address]    4. History of CVA (cerebrovascular accident)    5. Presence of prosthetic heart valve    6. History of colon polyps        Plan:      Patient given educational materials - see patient instructions. Discussed use, benefit, and side effects of prescribed medications. All patient questions answered. Patient voiced understanding. Health Maintenance reviewed -patient continues to refuse colonoscopy, he will think about getting the Cologuard. Instructed to continue current medications, diet and exercise. Patient agreed with treatment plan. Follow up as directed. Return in about 6 months (around 3/1/2022). This note was generated completely or in part utilizing Dragon dictation speech recognition software. Occasionally, words are mistranscribed and despite editing, the text may contain inaccuracies due to incorrect word recognition. If further clarification is needed please contact the office at [phone].     Electronically signed by JACY Jack CNP on 9/6/2021

## 2021-09-06 ASSESSMENT — ENCOUNTER SYMPTOMS
CONSTIPATION: 0
WHEEZING: 0
ABDOMINAL PAIN: 0
DIARRHEA: 0
EYES NEGATIVE: 1
COUGH: 0
SHORTNESS OF BREATH: 0

## 2021-10-14 NOTE — PROGRESS NOTES
ORTHOPEDIC PATIENT EVALUATION      HPI / Chief Complaint  Rossy Sepulveda is a 56 y.o. right-hand-dominant male who presents for evaluation of his left wrist.  He indicates that a month ago he developed a mass at the level of the wrist and recently it has increased in size. He denies any precipitating trauma or injury. He has really no pain associated with this and it has not affected his use of his hand or wrist.  He reports one incidence of having some numbness and tingling involving the entire hand. He denies having any fevers, chills, sweats or constitutional symptoms. Today also complains of essentially acute onset left knee pain and swelling. He denies any precipitating trauma or injury. This has been ongoing for about a week now. He has limited range of motion as a result of the swelling in the knee and it is usually worse with weightbearing activity. He denies having any painful popping locking or catching in the knee. Past Medical History  Amira GILES  has a past medical history of Age-related cataract of both eyes, H/O testicular cancer, Hyperlipidemia, Hypertension, and Nonrheumatic mitral valve insufficiency. Past Surgical History  Amira GILES  has a past surgical history that includes hernia repair; Cardiac surgery; Cardiac valve replacement (2009); Small intestine surgery; Vein Surgery; and Cataract removal (Bilateral). Current Medications  Current Outpatient Medications   Medication Sig Dispense Refill    aspirin 81 MG chewable tablet Take 81 mg by mouth daily      metoprolol succinate (TOPROL XL) 25 MG extended release tablet Take 25 mg by mouth daily      simvastatin (ZOCOR) 40 MG tablet TAKE 1 TABLET BY MOUTH ONCE DAILY AT BEDTIME       No current facility-administered medications for this visit. Allergies  Allergies have been reviewed. Danton Knife is allergic to cephalexin and penicillin v.    Social History  Amira GILES  reports that he has never smoked.  He has never used smokeless tobacco. He reports previous alcohol use. He reports previous drug use. Family History  Froy GILES's family history includes Cancer in his mother; High Blood Pressure in his mother; Other in his mother. Review of Systems   History obtained from the patient. REVIEW OF SYSTEMS:   Constitution: negative for fever, chills, weight loss or malaise   Musculoskeletal: As noted in the HPI   Neurologic: As noted in the HPI    Physical Exam  Blood pressure (!) 140/104, pulse 92, weight 229 lb (103.9 kg), SpO2 95 %. General Appearance: alert, well appearing, and in no distress  Mental Status: alert, oriented to person, place, and time  Evaluation the patient's left wrist and upper extremity demonstrates intact skin without warmth, erythema, or notable swelling. He does have a discrete mass over the radial aspect of the wrist just distal to the styloid. It is about 1 x 1 cm. It is round and mobile relatively firm. It is nonpulsatile and nontender to palpation. He has a negative Tinel's over the cyst.  He has approximately 65 degrees of wrist flexion and 70 degrees of wrist extension symmetric to the contralateral side. Sensation is grossly intact light touch in all dermatomes and he has a 2+ radial pulse with brisk cap refill in his fingers. Evaluation of the patient's left knee and lower extremity demonstrates intact skin without warmth erythema. He does have a moderate effusion in the knee joint. Sensation is grossly intact light touch in all dermatomes and he has 2+ pedal pulses. He lacks approximately 10 degrees of full extension and flexes to 90 degrees before becoming too painful. On the contralateral side he has full knee extension with flexion to 120 degrees. He is tender to palpation along the medial joint line of the left knee and has a positive Apley's maneuver.   He has no gross instability with varus and valgus stress applied across the joint at 0 and 30 degrees of knee flexion and he has a negative anterior and posterior drawer test.  He does ambulate today with an antalgic gait. Assessment and Plan  Dannie  is a 56 y.o. old male with a left wrist mass. It appears benign. We did discuss possible etiologies. At the top of the list is likely a ganglion cyst.  We have no imaging at this time. I recommended proceeding with an x-ray of the wrist in addition to an ultrasound of the cyst to assess for whether or not it is a solid mass or fluid-filled. If it is the latter he will likely confirm the diagnosis of a ganglion cyst.  We discussed treatment options available for managing this including nonoperative and operative intervention. I would recommend attempting a course of conservative management initially however certainly he can elect to proceed surgically with an excision once we have a provisional diagnosis. Alternatively we could proceed with an aspiration and cortisone injection although there is a relatively high risk of recurrence with this. With regards to his knee likewise we discussed possible etiologies chief among them underlying osteoarthritis versus a meniscus tear. I recommended proceeding at this time with x-rays of the knee. He is to notify me once these are completed and we can follow-up with appropriate treatment recommendations at that time as indicated. In the meantime I did place him on a 2-week course of Voltaren. Review of his problem list indicates that he does have stage III kidney disease. When I did bring this up with the patient he has completely unaware of this. He does not have a nephrologist.  This is the first he is hearing of this. Consequently the prescription was sent. I did recommend that he discuss this further with his PCP. Dapsone Counseling: I discussed with the patient the risks of dapsone including but not limited to hemolytic anemia, agranulocytosis, rashes, methemoglobinemia, kidney failure, peripheral neuropathy, headaches, GI upset, and liver toxicity.  Patients who start dapsone require monitoring including baseline LFTs and weekly CBCs for the first month, then every month thereafter.  The patient verbalized understanding of the proper use and possible adverse effects of dapsone.  All of the patient's questions and concerns were addressed.

## 2022-03-02 ENCOUNTER — OFFICE VISIT (OUTPATIENT)
Dept: FAMILY MEDICINE CLINIC | Age: 62
End: 2022-03-02
Payer: COMMERCIAL

## 2022-03-02 VITALS
DIASTOLIC BLOOD PRESSURE: 86 MMHG | BODY MASS INDEX: 33.97 KG/M2 | SYSTOLIC BLOOD PRESSURE: 134 MMHG | HEART RATE: 82 BPM | WEIGHT: 230 LBS | OXYGEN SATURATION: 97 %

## 2022-03-02 DIAGNOSIS — E78.2 MIXED HYPERLIPIDEMIA: ICD-10-CM

## 2022-03-02 DIAGNOSIS — R73.01 IFG (IMPAIRED FASTING GLUCOSE): ICD-10-CM

## 2022-03-02 DIAGNOSIS — N18.31 STAGE 3A CHRONIC KIDNEY DISEASE (HCC): ICD-10-CM

## 2022-03-02 DIAGNOSIS — I10 ESSENTIAL HYPERTENSION: Primary | ICD-10-CM

## 2022-03-02 DIAGNOSIS — Z86.73 HISTORY OF CVA (CEREBROVASCULAR ACCIDENT): ICD-10-CM

## 2022-03-02 DIAGNOSIS — Z95.2 PRESENCE OF PROSTHETIC HEART VALVE: ICD-10-CM

## 2022-03-02 DIAGNOSIS — R00.2 PALPITATIONS: ICD-10-CM

## 2022-03-02 PROCEDURE — 99214 OFFICE O/P EST MOD 30 MIN: CPT | Performed by: NURSE PRACTITIONER

## 2022-03-02 PROCEDURE — 99213 OFFICE O/P EST LOW 20 MIN: CPT

## 2022-03-02 RX ORDER — SIMVASTATIN 40 MG
TABLET ORAL
Qty: 30 TABLET | Refills: 5 | Status: SHIPPED | OUTPATIENT
Start: 2022-03-02

## 2022-03-02 ASSESSMENT — ENCOUNTER SYMPTOMS: SHORTNESS OF BREATH: 0

## 2022-03-02 ASSESSMENT — PATIENT HEALTH QUESTIONNAIRE - PHQ9
SUM OF ALL RESPONSES TO PHQ QUESTIONS 1-9: 0
1. LITTLE INTEREST OR PLEASURE IN DOING THINGS: 0
SUM OF ALL RESPONSES TO PHQ QUESTIONS 1-9: 0
SUM OF ALL RESPONSES TO PHQ9 QUESTIONS 1 & 2: 0
SUM OF ALL RESPONSES TO PHQ QUESTIONS 1-9: 0
2. FEELING DOWN, DEPRESSED OR HOPELESS: 0
SUM OF ALL RESPONSES TO PHQ QUESTIONS 1-9: 0

## 2022-03-02 NOTE — PROGRESS NOTES
[address] [place] [address] [address]  Dept: [phone]  Dept Fax: [phone]    History and Physical  Patient:  Tati Orellana  YOB: 1960  Date of Service:  3/2/2022    Subjective:   Tati Orellana (:  1960) is a 59 y.o. male, Established patient, here for evaluation of the following chief complaint(s):    Chief Complaint   Patient presents with    6 Month Follow-Up     denies any issues or complaints    Hypertension    Hyperlipidemia      Not moniotoring BP at home    Hypertension  This is a chronic problem. The current episode started more than 1 year ago. The problem is controlled. Pertinent negatives include no anxiety, chest pain, headaches, malaise/fatigue, orthopnea, palpitations, peripheral edema, PND or shortness of breath. There are no associated agents to hypertension. Risk factors for coronary artery disease include male gender, obesity and dyslipidemia. Past treatments include beta blockers. The current treatment provides moderate improvement. There are no compliance problems. Hypertensive end-organ damage includes kidney disease and CVA. Identifiable causes of hypertension include chronic renal disease. Hyperlipidemia  This is a chronic problem. The problem is controlled. Recent lipid tests were reviewed and are normal. Exacerbating diseases include chronic renal disease and obesity. Factors aggravating his hyperlipidemia include beta blockers. Pertinent negatives include no chest pain, focal sensory loss, focal weakness, leg pain, myalgias or shortness of breath. Current antihyperlipidemic treatment includes statins. The current treatment provides moderate improvement of lipids. Compliance problems include adherence to diet and adherence to exercise. Risk factors for coronary artery disease include male sex, obesity, hypertension and dyslipidemia.      Heart palpitations, history valve replacement  Previously followed with Dr. Derek Wilkinson yearly but no longer on his insurance. He was referred to Dr. Bronson Wick, but patient still has not established. The 10-year ASCVD risk score (Emily Arshad, et al., 2013) is: 10.5%    Values used to calculate the score:      Age: 59 years      Sex: Male      Is Non- : No      Diabetic: No      Tobacco smoker: No      Systolic Blood Pressure: 074 mmHg      Is BP treated: No      HDL Cholesterol: 49 mg/dL      Total Cholesterol: 168 mg/dL     BP Readings from Last 3 Encounters:   03/02/22 134/86   09/01/21 116/80   06/04/21 (!) 144/104      Pulse Readings from Last 3 Encounters:   03/02/22 82   09/01/21 73   06/04/21 85      Wt Readings from Last 3 Encounters:   03/02/22 230 lb (104.3 kg)   09/01/21 229 lb 8 oz (104.1 kg)   06/04/21 232 lb (105.2 kg)        Allergies   Allergen Reactions    Cephalexin Swelling    Penicillin V Swelling       Current Outpatient Medications   Medication Sig Dispense Refill    simvastatin (ZOCOR) 40 MG tablet TAKE 1 TABLET BY MOUTH ONCE DAILY AT BEDTIME 30 tablet 5    metoprolol succinate (TOPROL XL) 25 MG extended release tablet Take 1 tablet by mouth daily 30 tablet 11    aspirin 81 MG chewable tablet Take 81 mg by mouth daily       No current facility-administered medications for this visit. Past Medical History:   Diagnosis Date    Age-related cataract of both eyes 02/05/2018 2/2018 planning removal.    H/O testicular cancer 2008    History of colon polyps 1/30/2017    Multiple on 1/2017 colonoscopy. Multiple on 1/2018 cscope.  History of CVA (cerebrovascular accident) 2/5/2018    Around 2012 had R occipital lobe infarct. Has some L sided vision issues since then.     History of mitral valve replacement 5/16/2016 2/5/09.  7/3/17 echo looks pretty normal.  Formatting of this note might be different from the original. 2/5/09.  7/3/17 echo looks pretty normal.    History of testicular cancer 5/16/2016    Left orchiectomy 2008. Removed by Zita Carrillo.  Hyperlipidemia     Hypertension     Nonrheumatic mitral valve insufficiency 09/01/2010       Past Surgical History:   Procedure Laterality Date    CARDIAC SURGERY      CARDIAC VALVE REPLACEMENT  2009    CATARACT REMOVAL Bilateral     HERNIA REPAIR      SMALL INTESTINE SURGERY      VEIN SURGERY       Family History   Problem Relation Age of Onset    High Blood Pressure Mother     Cancer Mother     Other Mother      Social History     Tobacco Use    Smoking status: Never Smoker    Smokeless tobacco: Never Used   Vaping Use    Vaping Use: Never used   Substance Use Topics    Alcohol use: Not Currently    Drug use: Not Currently       Review of Systems:     Review of Systems   Constitutional: Negative for appetite change, chills, fatigue, fever and malaise/fatigue. HENT: Negative. Eyes: Negative. Respiratory: Negative for cough, shortness of breath and wheezing. Cardiovascular: Negative for chest pain, palpitations, orthopnea, leg swelling and PND. Gastrointestinal: Negative for abdominal pain, constipation and diarrhea. Genitourinary: Negative. Musculoskeletal: Negative for arthralgias and myalgias. Neurological: Negative for dizziness, focal weakness, weakness, light-headedness and headaches. Psychiatric/Behavioral: Negative for agitation, dysphoric mood, self-injury, sleep disturbance and suicidal ideas. The patient is not nervous/anxious. PHQ Scores 3/2/2022 9/1/2021 3/1/2021   PHQ2 Score 0 0 0   PHQ9 Score 0 0 0     Interpretation of Total Score Depression Severity: 1-4 = Minimal depression, 5-9 = Mild depression, 10-14 = Moderate depression, 15-19 = Moderately severe depression, 20-27 = Severe depression     Physical Exam:     Vitals:    03/02/22 1616   BP: 134/86   Pulse: 82   SpO2: 97%   Weight: 230 lb (104.3 kg)      Body mass index is 33.97 kg/m². Physical Exam  Constitutional:       Appearance: Normal appearance.  He is well-developed and well-groomed. He is obese. HENT:      Head: Normocephalic. Eyes:      Conjunctiva/sclera: Conjunctivae normal.      Pupils: Pupils are equal, round, and reactive to light. Neck:      Thyroid: No thyromegaly. Vascular: No carotid bruit. Cardiovascular:      Rate and Rhythm: Normal rate and regular rhythm. Heart sounds: Normal heart sounds. Pulmonary:      Effort: Pulmonary effort is normal.      Breath sounds: Normal breath sounds. No wheezing. Abdominal:      General: Bowel sounds are normal.      Palpations: Abdomen is soft. Tenderness: There is no abdominal tenderness. Musculoskeletal:      Cervical back: Neck supple. Right lower leg: No edema. Left lower leg: No edema. Lymphadenopathy:      Cervical: No cervical adenopathy. Skin:     Capillary Refill: Capillary refill takes less than 2 seconds. Neurological:      Mental Status: He is alert and oriented to person, place, and time. Gait: Gait normal.   Psychiatric:         Mood and Affect: Mood normal.         Behavior: Behavior is cooperative. Assessment/Plan:   1. Essential hypertension  Assessment & Plan:   Well-controlled, continue current medications  Orders:  -     Microalbumin, Ur; Future  -     Lipid, Fasting; Future  -     Comprehensive Metabolic Panel; Future  -     CBC with Auto Differential; Future  2. Mixed hyperlipidemia  -     simvastatin (ZOCOR) 40 MG tablet; TAKE 1 TABLET BY MOUTH ONCE DAILY AT BEDTIME, Disp-30 tablet, R-5Normal  -     Lipid, Fasting; Future  -     Comprehensive Metabolic Panel; Future  -     CBC with Auto Differential; Future  3. Presence of prosthetic heart valve  -     Comprehensive Metabolic Panel; Future  4. Stage 3a chronic kidney disease (HCC)  -     CBC with Auto Differential; Future  5. IFG (impaired fasting glucose)  -     Hemoglobin A1C; Future  -     CBC with Auto Differential; Future  6.  History of CVA (cerebrovascular accident)  -     Lipid, Fasting; Future  -     Comprehensive Metabolic Panel; Future  -     Hemoglobin A1C; Future  -     CBC with Auto Differential; Future  7. Palpitations        All patient questions answered. Patient voiced understanding. Instructed to continue current medications. Patient agreed with treatment plan. Follow up as directed. Return in about 6 months (around 9/2/2022). Please note that this chart was generated using voice recognition Dragon dictation software. Although every effort was made to ensure the accuracy of this automated transcription, some errors in transcription may have occurred.     Electronically signed by JACY Gay CNP on 3/6/2022

## 2022-03-06 PROBLEM — Z86.010 HISTORY OF COLON POLYPS: Status: RESOLVED | Noted: 2017-01-30 | Resolved: 2022-03-06

## 2022-03-06 PROBLEM — Z86.73 HISTORY OF CVA (CEREBROVASCULAR ACCIDENT): Status: RESOLVED | Noted: 2018-02-05 | Resolved: 2022-03-06

## 2022-03-06 ASSESSMENT — ENCOUNTER SYMPTOMS
ABDOMINAL PAIN: 0
COUGH: 0
CONSTIPATION: 0
WHEEZING: 0
ORTHOPNEA: 0
DIARRHEA: 0
EYES NEGATIVE: 1

## 2022-03-08 LAB
ALBUMIN/GLOBULIN RATIO: 2 G/DL
ALBUMIN: 4.7 G/DL (ref 3.5–5)
ALP BLD-CCNC: 43 UNITS/L (ref 38–126)
ALT SERPL-CCNC: 40 UNITS/L (ref 4–50)
ANION GAP SERPL CALCULATED.3IONS-SCNC: 15.2 MMOL/L
AST SERPL-CCNC: 33 UNITS/L (ref 17–59)
BASOPHILS %: 1.08 (ref 0–3)
BASOPHILS ABSOLUTE: 0.08 (ref 0–0.3)
BILIRUB SERPL-MCNC: 1.2 MG/DL (ref 0.2–1.3)
BUN BLDV-MCNC: 16 MG/DL (ref 9–20)
CALCIUM SERPL-MCNC: 9.4 MG/DL (ref 8.4–10.2)
CHLORIDE BLD-SCNC: 103 MMOL/L (ref 98–120)
CHOLESTEROL/HDL RATIO: 3.68 RATIO (ref 0–4.5)
CHOLESTEROL: 173 MG/DL (ref 50–200)
CO2: 26 MMOL/L (ref 22–31)
CREAT SERPL-MCNC: 1.3 MG/DL (ref 0.7–1.3)
CREATININE, RANDOM URINE: 123.2 MG/DL (ref 20–370)
EOSINOPHILS %: 1.94 (ref 0–10)
EOSINOPHILS ABSOLUTE: 0.14 (ref 0–1.1)
GFR CALCULATED: 59.3
GLOBULIN: 2.4 G/DL
GLUCOSE: 87 MG/DL (ref 75–110)
HBA1C MFR BLD: 6 % (ref 4.4–6.4)
HCT VFR BLD CALC: 52.4 % (ref 42–52)
HDLC SERPL-MCNC: 47 MG/DL (ref 36–68)
HEMOGLOBIN: 16.6 (ref 13.8–17.8)
LDL CHOLESTEROL CALCULATED: 106.2 MG/DL (ref 0–160)
LYMPHOCYTE %: 25.72 (ref 20–51.1)
LYMPHOCYTES ABSOLUTE: 1.86 (ref 1–5.5)
MCH RBC QN AUTO: 28.4 PG (ref 28.5–32.5)
MCHC RBC AUTO-ENTMCNC: 31.6 G/DL (ref 32–37)
MCV RBC AUTO: 89.8 FL (ref 80–94)
MICROALBUMIN UR-MCNC: 0.8 MG/DL (ref 0–1.7)
MICROALBUMIN/CREAT UR-RTO: 6.49
MONOCYTES %: 8.38 (ref 1.7–9.3)
MONOCYTES ABSOLUTE: 0.6 (ref 0.1–1)
NEUTROPHILS %: 62.87 (ref 42.2–75.2)
NEUTROPHILS ABSOLUTE: 4.54 (ref 2–8.1)
PDW BLD-RTO: 13.9 % (ref 10–15.5)
PLATELET # BLD: 218.5 THOU/MM3 (ref 130–400)
POTASSIUM SERPL-SCNC: 5.2 MMOL/L (ref 3.6–5)
RBC: 5.84 M/UL (ref 4.7–6.1)
SODIUM BLD-SCNC: 139 MMOL/L (ref 135–145)
TOTAL PROTEIN, SERUM: 7.1 G/DL (ref 6.3–8.2)
TRIGL SERPL-MCNC: 99 MG/DL (ref 10–250)
VLDLC SERPL CALC-MCNC: 20 MG/DL (ref 0–50)
WBC: 7.2 THOU/ML3 (ref 4.8–10.8)

## 2022-03-18 DIAGNOSIS — E87.5 HYPERKALEMIA: Primary | ICD-10-CM

## 2022-03-30 DIAGNOSIS — E87.5 HYPERKALEMIA: ICD-10-CM

## 2022-03-30 LAB — POTASSIUM SERPL-SCNC: 4.2 MMOL/L (ref 3.6–5)

## 2022-05-13 DIAGNOSIS — I10 ESSENTIAL HYPERTENSION: ICD-10-CM

## 2022-05-14 NOTE — TELEPHONE ENCOUNTER
Kt Brown is requesting a refill on the following medication(s):  Requested Prescriptions     Pending Prescriptions Disp Refills    metoprolol succinate (TOPROL XL) 25 MG extended release tablet [Pharmacy Med Name: Metoprolol Succinate ER 25 MG Oral Tablet Extended Release 24 Hour] 30 tablet 0     Sig: Take 1 tablet by mouth once daily       Last Visit Date (If Applicable):  3/5/8910    Next Visit Date:    9/7/2022

## 2022-05-15 RX ORDER — METOPROLOL SUCCINATE 25 MG/1
TABLET, EXTENDED RELEASE ORAL
Qty: 30 TABLET | Refills: 5 | Status: SHIPPED | OUTPATIENT
Start: 2022-05-15

## 2023-09-19 ENCOUNTER — TELEPHONE (OUTPATIENT)
Dept: SURGERY | Age: 63
End: 2023-09-19

## 2024-05-26 NOTE — TELEPHONE ENCOUNTER
Left message for patient offering to mail colonoscopy packet based off patients chart, patient is due for colonoscopy. Call back number left on voicemail. [phone]